# Patient Record
Sex: FEMALE | Race: BLACK OR AFRICAN AMERICAN | NOT HISPANIC OR LATINO | Employment: OTHER | URBAN - METROPOLITAN AREA
[De-identification: names, ages, dates, MRNs, and addresses within clinical notes are randomized per-mention and may not be internally consistent; named-entity substitution may affect disease eponyms.]

---

## 2023-06-07 PROBLEM — N39.0 URINARY TRACT INFECTION WITHOUT HEMATURIA: Status: ACTIVE | Noted: 2023-06-07

## 2023-06-07 PROBLEM — I10 HYPERTENSION: Status: ACTIVE | Noted: 2023-06-07

## 2023-06-07 PROBLEM — R79.89 ELEVATED D-DIMER: Status: ACTIVE | Noted: 2023-06-07

## 2023-09-11 PROBLEM — N39.0 URINARY TRACT INFECTION WITHOUT HEMATURIA: Status: RESOLVED | Noted: 2023-06-07 | Resolved: 2023-09-11

## 2024-02-09 ENCOUNTER — CLINICAL SUPPORT (OUTPATIENT)
Dept: REHABILITATION | Facility: HOSPITAL | Age: 89
End: 2024-02-09
Payer: MEDICARE

## 2024-02-09 DIAGNOSIS — I89.0 LYMPHEDEMA: ICD-10-CM

## 2024-02-09 PROCEDURE — 97161 PT EVAL LOW COMPLEX 20 MIN: CPT | Mod: PN

## 2024-02-09 PROCEDURE — 97535 SELF CARE MNGMENT TRAINING: CPT | Mod: PN

## 2024-02-09 NOTE — PLAN OF CARE
[facility]   Physical Therapy Initial Evaluation / Lymphedema         Name: Aditi Loyd  Clinic Number: [id number]    Therapy Diagnosis:   Encounter Diagnosis   Name Primary?    Lymphedema      Physician: Gilles Klein MD    Physician Orders: PT Eval and Treat   Medical Diagnosis from Referral: lymphedema  Evaluation Date: 2/9/2024  Authorization Period Expiration: 12/31/2024  Plan of Care Expiration: 4/19/2024  Progress note due: next visit  Visit # / Visits authorized: 1/ 1    Time In: 0800 am  Time Out: 0907  am  Total Billable Time: 40 minutes    Precautions: Standard    Subjective   Date of onset: June 2023  History of current condition - Aditi reports: swelling in the left leg started in June of 2023 after a UTI. Swelling was a gradual onset that improves with elevation     Medical History:   Past Medical History:   Diagnosis Date    Arthritis     Cataract     Glaucoma     Hypertension     Renal disorder        Surgical History:   Aditi Loyd  has a past surgical history that includes Hysterectomy; Trabeculectomy (Bilateral); and Cataract extraction (Right).    Medications:   Aditi has a current medication list which includes the following prescription(s): cranberry fruit concentrate, docusate sodium, dorzolamide-timolol 2-0.5%, estradiol, ezetimibe, glucosam/chond/hyalu/cf borate, lactulose, latanoprost, losartan-hydrochlorothiazide 50-12.5 mg, metoprolol tartrate, gemtesa, gemtesa, and vit c/e/zn/coppr/lutein/zeaxan.    Allergies:   Review of patient's allergies indicates:   Allergen Reactions    Atorvastatin Other (See Comments)     Fatigue and muscle/joint pain    Aspirin Other (See Comments)    Amlodipine Itching    Lovastatin Itching        Surgery: none   Radiation:  - weeks  Chemotherapy: -   Hormonal Medications: -   Pt denies CHF, KF, and DM.  Previous Lymphedema Treatment: no  Social History:  lives alone with intermittent  assist  Family Support:good  Nutritional status: adequate  Educational needs: no knowledge of lymphedema   Fall risk: uses a rollator   Occupation: retired  Gait: safe with a rollator   Transfers:independent   Bed Mobility: has to manually lift left leg in the bed due to swelling     Pain:  Current 0/10, worst 0/10, best 0/10       Pts goals: manage swelling in left leg    Objective     STAGE II Secondary Lymphedema  Amount of Swelling: moderate left, mild right  Location of Swelling: bilateral lower legs and ankles, left thigh  Skin Integrity: intact  Palpation/Texture: 3+ pitting edema left lower distal leg and ankle; 1+ pitting edema right ankle  Circulation: adequate for compression      Range of Motion - LE  Able to reach to feet and ankles      Sensation: intact      Girth Measurements (in centimeters)  LANDMARK LEFT LE  2/9/2024 RIGHT LE  2/9/2024 DIFF   at eval   SBP + 20 cm (siiting) 65 cm 61 cm 4 cm   SBP + 10 cm 58 cm 54 cm 4 cm   SBP 56 cm 52.5 cm 3.5 cm   10 cm below SBP - cm - cm - cm   20 cm below SBP 38.5 cm 37.5 cm 1 cm   30 cm below SBP - cm - cm - cm   35 cm below SBP - cm - cm - cm   Ankle 25.5 cm 22 cm 3.5 cm   Forefoot 23 cm 23 cm - cm   Y measurement 34.5 cm 34.5      Length - 40cm    FUNCTION:  CMS Impairment/Limitation/Restriction for FOTO lower quadrant swelling Survey    Therapist reviewed FOTO scores for Aditi Loyd on 2/9/2024  FOTO documents entered into [employer] - see Media section.    Functional Score: 64%         TREATMENT   Treatment Time In: 0823 am  Treatment Time Out: 0907 am  Total Treatment time separate from Evaluation: 40 minutes     Aditi received self care/home management to develop knowledge/understanding of lymphedema etiology, progression and treatment options x 40 minutes including:    -discussed etiology of lymphedema and the lymphatic system  -discussed cellulitis and ways to decrease risk of cellulitis  -discussed compression benefits for veins and  lymphatics  -discussed compression options for left full leg/foot and right lower leg/ankle  -instructed patient and daughter how to marzena liner socks with and without a sock aid  -instructed patient and daughter how to marzena velcro calf and knee wraps  -reviewed benefits of using a pump and briefly demonstrated donning of sleeve for pump (will instruct at further sessions)  -measured left leg for calf and knee velcro wraps: Small, Tall - calf, large - knee (color- black)  -faxed orders, insurance, demographics to [employer] for compression pump and Knee/Calf velcro wraps  -will determine at a later time if a thigh compression wrap is needed    Home Exercises and Patient Education Provided    Education provided:   - see above section  - Pt was educated in lymphedema etiology and management plans.  Pt was provided with written  risk reductions and precautions for managing lymphedema.      This patient is in agreement to participate in Lymphedema treatment.    Written Home Exercises and/or information Provided:  n/a .      Assessment   Aditi is a 89 y.o. female referred to [facility] with a medical diagnosis of lymphedema. This patient presents s/p gradual onset of swelling that is not resolving   resulting in: Stage II lymphedema of the left leg, and placing the pt at higher risk of infection.     Pt prognosis is Good.   Pt will benefit from skilled outpatient Physical Therapy to address the deficits stated above and in the chart below, provide pt/family education, and to maximize pt's level of independence.     Plan of care discussed with patient: Yes  Pt's spiritual, cultural and educational needs considered and patient is agreeable to the plan of care and goals as stated below:     Anticipated Barriers for therapy: none    Medical Necessity is demonstrated by the following  History  Co-morbidities and personal factors that may impact the plan of care [] LOW: no personal factors /  co-morbidities  [x] MODERATE: 1-2 personal factors / co-morbidities  [] HIGH: 3+ personal factors / co-morbidities    Moderate / High Support Documentation:   Co-morbidities affecting plan of care: -    Personal Factors:   no deficits     Examination  Body Structures and Functions, activity limitations and participation restrictions that may impact the plan of care [x] LOW: addressing 1-2 elements  [] MODERATE: 3+ elements  [] HIGH: 4+ elements (please support below)    Moderate / High Support Documentation: -     Clinical Presentation [x] LOW: stable  [] MODERATE: Evolving  [] HIGH: Unstable     Decision Making/ Complexity Score: low         The following goals were discussed with the patient and patient is in agreement with them as to be addressed in the treatment plan.     Short Term Goals: 5 weeks)  1. Patient will show decreased girth in L LE by up to 2 cm to allow for LE symmetry, shoe and clothing choice, and ability to apply needed compression.  (progressing, not met)   2. Patient will demonstrate 100% knowledge of lymphedema precautions and signs of infection to allow for reduced lymphedema risk, infection risk, and/or exacerbation of condition.  (progressing, not met)  3. Patient or caregiver will perform self-bandaging techniques and/or wearing of compression garments to allow for lymphatic drainage support, skin elasticity, and reduction in shape and size of limb. (progressing, not met)  4. Patient will perform self lymph drainage techniques to areas within reach to enhance lymphatic drainage and skin condition.  (progressing, not met)  5. Patient will tolerate daily activities with multilayered bandaging to allow for lymphatic and venous support.  (progressing, not met)    Long Term Goals: (10  weeks)  1. Patient will show decreased girth in L LE by up to 4 cm  to allow for LE symmetry, shoe and clothing choice, and ability to apply needed compression daily.  (progressing, not met)  2. Patient will show  reduction in density to mild or less with improved contour of limb to allow for cosmesis, LE symmetry, infection risk reduction, and clothing and compression choice.   (progressing, not met)  3. Patient to marzena/doff compression garment with daily compliance to assist in lymphedema management, skin elasticity, and tissue density.  (progressing, not met)      Plan   Plan of care Certification: 2/9/2024 to 4/19/2024.    Outpatient Physical Therapy 1 times weekly for up to 10 weeks to include the following interventions: Manual Therapy, Patient Education, Self Care, Therapeutic Exercise, and vaso-pneumatic compression . Complete Decongestive Therapy- compression and home equipment needs to be addressed and assisted.      Jerica Haynes, PT, CLT-GIANA

## 2024-03-28 PROBLEM — H54.10 BLINDNESS AND LOW VISION: Status: ACTIVE | Noted: 2024-03-28

## 2024-05-03 ENCOUNTER — CLINICAL SUPPORT (OUTPATIENT)
Dept: REHABILITATION | Facility: HOSPITAL | Age: 89
End: 2024-05-03
Payer: MEDICARE

## 2024-05-03 DIAGNOSIS — I89.0 LYMPHEDEMA: Primary | ICD-10-CM

## 2024-05-03 PROCEDURE — 97016 VASOPNEUMATIC DEVICE THERAPY: CPT | Mod: PN

## 2024-05-03 PROCEDURE — 97535 SELF CARE MNGMENT TRAINING: CPT | Mod: PN

## 2024-05-03 NOTE — PROGRESS NOTES
[facility]   Physical Therapy Treatment Note/Lymphedema / UPDATED PLAN OF CARE     Name: Aditi Loyd  Clinic Number: [id number]    Therapy Diagnosis:   Encounter Diagnosis   Name Primary?    Lymphedema Yes      Physician: Gilles Klein MD    Visit Date: 5/3/2024     Physician Orders: PT Eval and Treat   Medical Diagnosis from Referral: lymphedema  Evaluation Date: 2/9/2024  Authorization Period Expiration: 12/31/2024  Plan of Care Expiration: 8/9/2024  Progress note due: 6/3/2024  Visit # / Visits authorized: 1    Time In: 10:00 am  Time Out: 11:10 am  Total Billable Time: 70 minutes    Precautions: Standard    Subjective     Pt reports: wearing compression socks daily and elevation of bilateral lower extremities frequently to manage swelling since last visit. Has not purchased the velcro wraps and would like to start the process of order the wraps today  She was compliant with home exercise program.  Response to previous treatment: good  Functional change: pitting edema has improved left leg    Pain: 0/10  Location:  n/a     Objective     Objective Measures updated at progress report unless specified    STAGE II Secondary Lymphedema  Amount of Swelling: moderate left, mild right  Location of Swelling: bilateral lower legs and ankles, left knee/thigh  Skin Integrity: intact  Palpation/Texture: 1+ pitting edema left lower distal leg and ankle; 1+ pitting edema right ankle (improved    Girth Measurements (in centimeters)- EVAL  LANDMARK LEFT LE  2/9/2024 RIGHT LE  2/9/2024 DIFF   at eval   SBP + 20 cm (siiting) 65 cm 61 cm 4 cm   SBP + 10 cm 58 cm 54 cm 4 cm   SBP 56 cm 52.5 cm 3.5 cm   10 cm below SBP - cm - cm - cm   20 cm below SBP 38.5 cm 37.5 cm 1 cm   30 cm below SBP - cm - cm - cm   35 cm below SBP - cm - cm - cm   Ankle 25.5 cm 22 cm 3.5 cm   Forefoot 23 cm 23 cm - cm   Y measurement 34.5 cm 34.5        Girth Measurements (in centimeters)- REASSESSMENT  LANDMARK  LEFT LE  5/3/2024   SBP + 20 cm (siiting) 65 cm   SBP + 10 cm 57 cm   SBP 56 cm   10 cm below SBP - cm   20 cm below SBP 39.5 cm   30 cm below SBP - cm   35 cm below SBP - cm   Ankle 25.5 cm   Forefoot 22.5 cm   Y measurement 36 cm          FUNCTION:  CMS Impairment/Limitation/Restriction for FOTO lower quadrant swelling Survey    Therapist reviewed FOTO scores for Aditi Loyd on 2/9/2024.   FOTO documents entered into [employer] - see Media section.    Limitation Score: 64%         Treatment:     Aditi received the following self care and home management x 70 minutes    -discussed deductible and cost for compression garments with [employer]. Aditi would have to pay $240 at [employer] for calf and knee compression velcro garments. This amount would meet her deductible. On [email or web address], total cost for a compression knee and calf garment would be $180, but would not go towards her deductible. Daughter opted to order online. She was assisted in ordering garments online while Aditi was using the compression pump.   -new measurements were obtained to order compression garments  -discussed Manual Lymph Drainage for the left leg. Aditi will not be able to perform self Manual Lymph Drainage and will return the first week in June for Manual Lymph Drainage treatments. Until then, a video of Manual Lymph Drainage was sent to the daughters cell phone. Aditi would like to be able to use a brush to reach to ankles and feet and she is willing to learn how to use a brush to perform self Manual Lymph Drainage on the leg.   -Aditi and her daughter will return after Memorial day for Manual Lymph Drainage and to follow up with compression garment needs.   -recommended daily wear of compression garments and adjusting straps frequently when first starting to use the velcro. It was recommended that Aditi wear a skirt/shift during those initial days of wearing the velcro to have easier access to the  straps.  -benefits of compression pump were explained. Daughter was instructed on donning of the compression sleeve and turning on the pump.  -faxed today's visit and the initial plan of care to [employer] for documentation required to obtain a home compression pump    Aditi received supervised modalities without adverse effects x 20 minutes:  SEQUENTIAL COMPRESSION PUMP: LEFT LE- Lympha press with full sleeve left leg with distal pressures at 40mmHg (she was on compression during time that the daughter was being assisted to order compression garments from [employer])    Home Exercises Provided and Patient Education Provided:   See self care section above  PATIENT/FAMILY Education: velcro wear schedule,  Beginning of self massage,       Assessment     Aditi and her daughter came to therapy today as a follow up from the 2/9/2024 evaluation. Since this time, Aditi has been wearing compression socks and elevating her legs which has helped a little. She is interested in obtaining the velcro wraps for her lower leg and calf since swelling in the leg is persistent.   Today, recommendations were made for compression and assistance was provided in ordering the compression garments online. Manual Lymph Drainage was discussed for follow up visits. She will return after Memorial day for Manual Lymph Drainage treatment and for follow up measurements of the left leg. Aditi will start wearing compression velcro wraps once they are delivered.  Aditi Is progressing well towards her goals. She has tried elevation, exercise, compression garments for bilateral lower extremities since 2/9/2024 and swelling still persists. Home pump is recommended.   Pt prognosis is Excellent.     Pt will continue to benefit from skilled outpatient physical therapy to address the deficits listed in the problem list box on initial evaluation, provide pt/family education and to maximize pt's level of independence in the home and  community environment.     Pt's spiritual, cultural and educational needs considered and pt agreeable to plan of care and goals.     Anticipated barriers to physical therapy: none    Goals:   Short Term Goals: (7 weeks)  1. Patient will show decreased girth in L LE by up to 2 cm to allow for LE symmetry, shoe and clothing choice, and ability to apply needed compression.  (progressing, not met)   2. Patient will demonstrate 100% knowledge of lymphedema precautions and signs of infection to allow for reduced lymphedema risk, infection risk, and/or exacerbation of condition.  (progressing, not met)  3. Patient or caregiver will perform self-bandaging techniques and/or wearing of compression garments to allow for lymphatic drainage support, skin elasticity, and reduction in shape and size of limb. (progressing, not met)  4. Patient will perform self lymph drainage techniques to areas within reach to enhance lymphatic drainage and skin condition.  (progressing, not met)  5. Patient will tolerate daily activities with multilayered bandaging to allow for lymphatic and venous support.  (progressing, not met)     Long Term Goals: (14  weeks)  1. Patient will show decreased girth in L LE by up to 4 cm  to allow for LE symmetry, shoe and clothing choice, and ability to apply needed compression daily.  (progressing, not met)  2. Patient will show reduction in density to mild or less with improved contour of limb to allow for cosmesis, LE symmetry, infection risk reduction, and clothing and compression choice.   (progressing, not met)  3. Patient to marzena/doff compression garment with daily compliance to assist in lymphedema management, skin elasticity, and tissue density.  (progressing, not met)        Plan   Plan of care Certification: 5/3/2024 to 8/9/2024     Outpatient Physical Therapy 1 times weekly for up to 14 weeks to include the following interventions: Manual Therapy, Patient Education, Self Care, Therapeutic Exercise,  and vaso-pneumatic compression . Complete Decongestive Therapy- compression and home equipment needs to be addressed and assisted.        Jerica Haynes, PT

## 2024-06-07 ENCOUNTER — CLINICAL SUPPORT (OUTPATIENT)
Dept: REHABILITATION | Facility: HOSPITAL | Age: 89
End: 2024-06-07
Payer: MEDICARE

## 2024-06-07 DIAGNOSIS — I89.0 LYMPHEDEMA: Primary | ICD-10-CM

## 2024-06-07 PROCEDURE — 97535 SELF CARE MNGMENT TRAINING: CPT | Mod: PN

## 2024-06-07 NOTE — PROGRESS NOTES
[facility]   Physical Therapy Treatment Note/Lymphedema / UPDATED PLAN OF CARE     Name: Aditi Loyd  Clinic Number: [id number]    Therapy Diagnosis:   Encounter Diagnosis   Name Primary?    Lymphedema Yes        Physician: Gilles Klein MD    Visit Date: 6/7/2024     Physician Orders: PT Eval and Treat   Medical Diagnosis from Referral: lymphedema  Evaluation Date: 2/9/2024  Authorization Period Expiration: 12/31/2024  Plan of Care Expiration: 8/9/2024  Progress note due: 6/3/2024  Visit # / Visits authorized: 2    Time In: 10:00 am  Time Out: 11:05 am  Total Billable Time: 65 minutes    Precautions: Standard    Subjective     Pt reports: wearing the compression socks intermittently. Did not buy the velcro wraps because she wanted to see how the socks would do. Someone from [facility] came by the house to fit for pump sleeve  She was compliant with home exercise program.  Response to previous treatment: good  Functional change: pitting edema has improved left leg    Pain: 0/10  Location:  n/a     Objective     Objective Measures updated at progress report unless specified  Progress note: 5/3/2024    FUNCTION:  CMS Impairment/Limitation/Restriction for FOTO lower quadrant swelling Survey    Therapist reviewed FOTO scores for Aditi Loyd on 2/9/2024.   FOTO documents entered into [employer] - see Media section.    Limitation Score: 64%         Treatment:     Aditi received the following self care and home management x 65 minutes  -Manual Lymph Drainage provided and instruction to daughter provided (in supine); daughter will be able to assist her mom with Manual Lymph Drainage as needed  -Manual Lymph Drainage for left leg instructed (in sitting) while provided skin care. Aditi was able to perform the massage with verbal cues/tactile cues  -bandaging of the left calf and ankle/foot provided with instruction to daughter on how to reapply bandages    Supplies  provided: tubi-, rosidal foam, 8cm bandages, two 10 cm bandages. Bandaging techniques were modified from the norm due to patient tolerance of compression      Aditi received supervised modalities without adverse effects x -- minutes:  SEQUENTIAL COMPRESSION PUMP: LEFT LE- Lympha press with full sleeve left leg with distal pressures at 40mmHg     Home Exercises Provided and Patient Education Provided:   See self care section above  PATIENT/FAMILY Education: velcro wear schedule,  Beginning of self massage,       Assessment     Aditi wears compression socks but intermittently. She did not want to get the velcro wraps at this time. Today, her left leg was wrapped in bandages (3 total bandages) to show how it will push the fluid proximally. The daughter was present a got a video of the bandaging techniques. She will re-apply bandages as they loosen. The knee and thigh will be bandages at a later session, pending tolerance of wearing the calf bandages.  Aditi Is progressing well towards her goals. She has tried elevation, exercise, compression garments for bilateral lower extremities since 2/9/2024 and swelling still persists. Home pump is recommended.   Pt prognosis is Excellent.     Pt will continue to benefit from skilled outpatient physical therapy to address the deficits listed in the problem list box on initial evaluation, provide pt/family education and to maximize pt's level of independence in the home and community environment.     Pt's spiritual, cultural and educational needs considered and pt agreeable to plan of care and goals.     Anticipated barriers to physical therapy: none    Goals:   Short Term Goals: (7 weeks)  1. Patient will show decreased girth in L LE by up to 2 cm to allow for LE symmetry, shoe and clothing choice, and ability to apply needed compression.  (progressing, not met)   2. Patient will demonstrate 100% knowledge of lymphedema precautions and signs of infection to allow  for reduced lymphedema risk, infection risk, and/or exacerbation of condition.  (progressing, not met)  3. Patient or caregiver will perform self-bandaging techniques and/or wearing of compression garments to allow for lymphatic drainage support, skin elasticity, and reduction in shape and size of limb. (progressing, not met)  4. Patient will perform self lymph drainage techniques to areas within reach to enhance lymphatic drainage and skin condition.  (progressing, not met)  5. Patient will tolerate daily activities with multilayered bandaging to allow for lymphatic and venous support.  (progressing, not met)     Long Term Goals: (14  weeks)  1. Patient will show decreased girth in L LE by up to 4 cm  to allow for LE symmetry, shoe and clothing choice, and ability to apply needed compression daily.  (progressing, not met)  2. Patient will show reduction in density to mild or less with improved contour of limb to allow for cosmesis, LE symmetry, infection risk reduction, and clothing and compression choice.   (progressing, not met)  3. Patient to marzena/doff compression garment with daily compliance to assist in lymphedema management, skin elasticity, and tissue density.  (progressing, not met)        Plan   Plan of care Certification: 5/3/2024 to 8/9/2024     Outpatient Physical Therapy 1 times weekly for up to 14 weeks to include the following interventions: Manual Therapy, Patient Education, Self Care, Therapeutic Exercise, and vaso-pneumatic compression . Complete Decongestive Therapy- compression and home equipment needs to be addressed and assisted.        Jerica Haynes, PT

## 2024-07-16 ENCOUNTER — CLINICAL SUPPORT (OUTPATIENT)
Dept: REHABILITATION | Facility: HOSPITAL | Age: 89
End: 2024-07-16
Payer: MEDICARE

## 2024-07-16 DIAGNOSIS — I89.0 LYMPHEDEMA: Primary | ICD-10-CM

## 2024-07-16 PROCEDURE — 97535 SELF CARE MNGMENT TRAINING: CPT | Mod: PN

## 2024-07-16 PROCEDURE — 97016 VASOPNEUMATIC DEVICE THERAPY: CPT | Mod: PN

## 2024-07-16 NOTE — PROGRESS NOTES
[facility]   Physical Therapy Treatment Note/Lymphedema / UPDATED PLAN OF CARE     Name: Aditi Loyd  Clinic Number: [id number]    Therapy Diagnosis:   Encounter Diagnosis   Name Primary?    Lymphedema Yes          Physician: Gilles Klein MD    Visit Date: 7/16/2024     Physician Orders: PT Eval and Treat   Medical Diagnosis from Referral: lymphedema  Evaluation Date: 2/9/2024  Authorization Period Expiration: 12/31/2024  Plan of Care Expiration: 8/9/2024  Progress note due: 8/16/2024  Visit # / Visits authorized: 2    Time In: 11:00 am  Time Out: 11:58 am  Total Billable Time: 38 minutes timed, 30 minutes untimed    Precautions: Standard    Subjective     Pt reports: was only able to wear the compression bandages for one day. Did not want to wear the bandages after the first day. Wears compression socks most of the time. Wants to have a home compression device at home to manage swelling.  She was compliant with home exercise program.  Response to previous treatment: good  Functional change: pitting edema has improved left leg    Pain: 0/10  Location:  n/a     Objective     Objective Measures updated at progress report unless specified  Progress note: 7/16/2024    FUNCTION:  CMS Impairment/Limitation/Restriction for FOTO lower quadrant swelling Survey    Therapist reviewed FOTO scores for Aditi Loyd on 7/16/2024.   FOTO documents entered into [employer] - see Media section.    Limitation Score: 64% (eval), 64% (eval)       STAGE II Secondary Lymphedema  Amount of Swelling: moderate left (full leg), mild right (ankle)  Location of Swelling: bilateral lower legs and ankles, left knee/thigh  Skin Integrity: intact  Palpation/Texture: 3+ pitting edema left lower distal leg and ankle; 3+ pitting edema right ankle (worsened since last assessment)     Girth Measurements (in centimeters)- EVAL  LANDMARK LEFT LE  2/9/2024 RIGHT LE  2/9/2024 DIFF   at eval   SBP + 20 cm  (siiting) 65 cm 61 cm 4 cm   SBP + 10 cm 58 cm 54 cm 4 cm   SBP 56 cm 52.5 cm 3.5 cm   10 cm below SBP - cm - cm - cm   20 cm below SBP 38.5 cm 37.5 cm 1 cm   30 cm below SBP - cm - cm - cm   35 cm below SBP - cm - cm - cm   Ankle 25.5 cm 22 cm 3.5 cm   Forefoot 23 cm 23 cm - cm   Y measurement 34.5 cm 34.5        Girth Measurements (in centimeters)- REASSESSMENT  LANDMARK LEFT LE  5/3/2024 Left lower extremity 7/16/2024   SBP + 20 cm (siiting) 65 cm 65 cm   SBP + 10 cm 57 cm 58 cm   SBP 56 cm 56 cm   10 cm below SBP - cm    20 cm below SBP 39.5 cm 41.5 cm   30 cm below SBP - cm    35 cm below SBP - cm    Ankle 25.5 cm 27.5 cm   Forefoot 22.5 cm 24 cm   Y measurement 36 cm 35.5 cm            Treatment:     Aditi received the following self care and home management x 38 minutes  -measurements obtained left leg (left leg has increased in size since last assessment/pitting edema has worsened)  -velcro wraps were reinforced to patient/daughter to help push fluid proximally. Daughter feels like her mother will not use the wraps and wants to try compression therapy with daily compression socks and compression pump therapy.  -instruction provided on using a compression pump at home and benefits of a compression pump  -called [employer] regarding status of compression pump appeal  -faxed today's note to [employer] per request    Aditi received supervised modalities without adverse effects x 30 minutes:  SEQUENTIAL COMPRESSION PUMP: LEFT LE- Lympha press with full sleeve left leg with distal pressures at 40mmHg     Home Exercises Provided and Patient Education Provided:   See self care section above  PATIENT/FAMILY Education: velcro wear schedule,  Beginning of self massage,       Assessment     Aditi wears compression socks but intermittently. She wore bandages for one day and did not wear them again. Daughter feels like her mother will be willing to use the compression pump daily and wear compression socks daily vs  wearing the velcro compression wraps. The compression pump was used today which showed improvement in her swelling (fluid is movable with manual push from hands).    Aditi Is progressing well towards her goals. She has tried elevation, exercise, compression garments for bilateral lower extremities since 2/9/2024 and swelling still persists. Home pump is recommended.   Pt prognosis is Excellent.     Pt will continue to benefit from skilled outpatient physical therapy to address the deficits listed in the problem list box on initial evaluation, provide pt/family education and to maximize pt's level of independence in the home and community environment.     Pt's spiritual, cultural and educational needs considered and pt agreeable to plan of care and goals.     Anticipated barriers to physical therapy: none    Goals:   Short Term Goals: (7 weeks)  1. Patient will show decreased girth in L LE by up to 2 cm to allow for LE symmetry, shoe and clothing choice, and ability to apply needed compression.  (progressing, not met)   2. Patient will demonstrate 100% knowledge of lymphedema precautions and signs of infection to allow for reduced lymphedema risk, infection risk, and/or exacerbation of condition.  (Goal met)  3. Patient or caregiver will perform self-bandaging techniques and/or wearing of compression garments to allow for lymphatic drainage support, skin elasticity, and reduction in shape and size of limb. (Goal met)  4. Patient will perform self lymph drainage techniques to areas within reach to enhance lymphatic drainage and skin condition.  (Goal met)  5. Patient will tolerate daily activities with multilayered bandaging to allow for lymphatic and venous support.  (discontinue)     Long Term Goals: (14  weeks)  1. Patient will show decreased girth in L LE by up to 4 cm  to allow for LE symmetry, shoe and clothing choice, and ability to apply needed compression daily.  (progressing, not met)  2. Patient will  show reduction in density to mild or less with improved contour of limb to allow for cosmesis, LE symmetry, infection risk reduction, and clothing and compression choice.   (progressing, not met)  3. Patient to marzena/doff compression garment with daily compliance to assist in lymphedema management, skin elasticity, and tissue density.  (progressing, not met)        Plan   Plan of care Certification: 5/3/2024 to 8/9/2024    Will return 8/2/2024 if more education is needed. The son may accompany her to the next visit     Outpatient Physical Therapy 1 times weekly for up to 14 weeks to include the following interventions: Manual Therapy, Patient Education, Self Care, Therapeutic Exercise, and vaso-pneumatic compression . Complete Decongestive Therapy- compression and home equipment needs to be addressed and assisted.        Jerica Haynes, PT

## 2024-09-17 PROBLEM — L03.119 CELLULITIS OF LOWER EXTREMITY: Status: ACTIVE | Noted: 2024-09-17

## 2025-03-03 ENCOUNTER — PATIENT MESSAGE (OUTPATIENT)
Dept: OPHTHALMOLOGY | Facility: CLINIC | Age: OVER 89
End: 2025-03-03
Payer: MEDICARE

## 2025-04-08 PROCEDURE — 87086 URINE CULTURE/COLONY COUNT: CPT | Performed by: PHYSICIAN ASSISTANT
